# Patient Record
Sex: FEMALE | Race: OTHER | Employment: FULL TIME | URBAN - NONMETROPOLITAN AREA
[De-identification: names, ages, dates, MRNs, and addresses within clinical notes are randomized per-mention and may not be internally consistent; named-entity substitution may affect disease eponyms.]

---

## 2020-10-07 ENCOUNTER — OFFICE VISIT (OUTPATIENT)
Dept: OBGYN CLINIC | Age: 20
End: 2020-10-07
Payer: COMMERCIAL

## 2020-10-07 ENCOUNTER — HOSPITAL ENCOUNTER (OUTPATIENT)
Age: 20
Setting detail: SPECIMEN
Discharge: HOME | End: 2020-10-07
Payer: COMMERCIAL

## 2020-10-07 VITALS
DIASTOLIC BLOOD PRESSURE: 54 MMHG | WEIGHT: 145 LBS | BODY MASS INDEX: 23.3 KG/M2 | SYSTOLIC BLOOD PRESSURE: 88 MMHG | HEIGHT: 66 IN

## 2020-10-07 PROCEDURE — 99385 PREV VISIT NEW AGE 18-39: CPT | Performed by: ADVANCED PRACTICE MIDWIFE

## 2020-10-07 ASSESSMENT — ENCOUNTER SYMPTOMS
RESPIRATORY NEGATIVE: 1
EYES NEGATIVE: 1
GASTROINTESTINAL NEGATIVE: 1

## 2020-10-07 NOTE — PROGRESS NOTES
Grandmother     No Known Problems Maternal Grandfather     No Known Problems Father     No Known Problems Mother     No Known Problems Brother     No Known Problems Sister     No Known Problems Other        Chief Complaint   Patient presents with    Annual Exam     Annual, doing well. First Pap. No concerns at this time. Labs:    No results found for this visit on 10/07/20. HPI: Denies breast/pelvic concerns. Menses regular. First pap smear.  currently in [facility]. Review of Systems   Constitutional: Negative. HENT: Negative. Eyes: Negative. Respiratory: Negative. Cardiovascular: Negative. Gastrointestinal: Negative. Genitourinary: Negative. Musculoskeletal: Negative. Neurological: Negative. Psychiatric/Behavioral: Negative. Objective  Blood pressure (!) 88/54, height 5' 6\" (1.676 m), weight 145 lb (65.8 kg), last menstrual period 10/02/2020. Physical Exam  Vitals signs and nursing note reviewed. Constitutional:       Appearance: Normal appearance. HENT:      Head: Normocephalic. Eyes:      Pupils: Pupils are equal, round, and reactive to light. Neck:      Musculoskeletal: Normal range of motion. No muscular tenderness. Cardiovascular:      Rate and Rhythm: Normal rate and regular rhythm. Pulses: Normal pulses. Heart sounds: Normal heart sounds. No murmur. Pulmonary:      Effort: Pulmonary effort is normal.      Breath sounds: Normal breath sounds. No wheezing. Abdominal:      General: Abdomen is flat. Palpations: Abdomen is soft. Tenderness: There is no abdominal tenderness. Musculoskeletal: Normal range of motion. Skin:     General: Skin is warm and dry. Neurological:      Mental Status: She is alert and oriented to person, place, and time.    Psychiatric:         Mood and Affect: Mood normal.         Behavior: Behavior normal.                                  Assessment and Plan          Diagnosis Orders   1. Smear, vaginal, as part of routine gynecological examination  PAP SMEAR       Repeat Annual every 1 year  Cervical Cytology Evaluation begins at 24years old. If Negative Cytology, Follow-up screening per current guidelines. Mammograms every 1year. If 37 yo and last mammogram was negative. Calcium and Vitamin D dosing reviewed. Colonoscopy screening reviewed as well as onset for bone density testing. Birth control and barrier recommendationsdiscussed. STD counseling and prevention reviewed. Gardisil counseling completed for all patients. .  Routine healthmaintenance per patients PCP. Bairon Phelps does not currently have medications on file. Return in about 1 year (around 10/7/2021) for Yearly. She was also counseled on her preventative health maintenance recommendations and follow-up. There are no Patient Instructions on file for this visit.     ASHA DE LA O,66/0/1857 9:40 AM

## 2020-10-20 LAB — CYTOLOGY REPORT: NORMAL

## 2021-01-14 ENCOUNTER — OFFICE VISIT (OUTPATIENT)
Dept: OBGYN CLINIC | Age: 21
End: 2021-01-14
Payer: COMMERCIAL

## 2021-01-14 ENCOUNTER — TELEPHONE (OUTPATIENT)
Dept: OBGYN CLINIC | Age: 21
End: 2021-01-14

## 2021-01-14 VITALS — DIASTOLIC BLOOD PRESSURE: 72 MMHG | BODY MASS INDEX: 23.76 KG/M2 | SYSTOLIC BLOOD PRESSURE: 121 MMHG | WEIGHT: 147.2 LBS

## 2021-01-14 DIAGNOSIS — N94.6 DYSMENORRHEA: Primary | ICD-10-CM

## 2021-01-14 DIAGNOSIS — N92.0 MENORRHAGIA WITH REGULAR CYCLE: ICD-10-CM

## 2021-01-14 PROCEDURE — 99213 OFFICE O/P EST LOW 20 MIN: CPT | Performed by: NURSE PRACTITIONER

## 2021-01-14 PROCEDURE — 1036F TOBACCO NON-USER: CPT | Performed by: NURSE PRACTITIONER

## 2021-01-14 PROCEDURE — G8420 CALC BMI NORM PARAMETERS: HCPCS | Performed by: NURSE PRACTITIONER

## 2021-01-14 PROCEDURE — G8484 FLU IMMUNIZE NO ADMIN: HCPCS | Performed by: NURSE PRACTITIONER

## 2021-01-14 PROCEDURE — G8427 DOCREV CUR MEDS BY ELIG CLIN: HCPCS | Performed by: NURSE PRACTITIONER

## 2021-01-14 ASSESSMENT — ENCOUNTER SYMPTOMS
RESPIRATORY NEGATIVE: 1
GASTROINTESTINAL NEGATIVE: 1

## 2021-01-14 NOTE — PROGRESS NOTES
PROBLEM VISIT     Date of service: 2021    Aleena Tesfaye  Is a 23 y.o. female    PT's PCP is: Dixie Parikh MD     : 2000                                             Subjective:       Patient's last menstrual period was 2020. OB History    Para Term  AB Living   0 0 0 0 0 0   SAB TAB Ectopic Molar Multiple Live Births   0 0 0 0 0 0        Social History     Tobacco Use   Smoking Status Never Smoker   Smokeless Tobacco Never Used        Social History     Substance and Sexual Activity   Alcohol Use Yes    Comment: Occasionally       Allergies: Patient has no known allergies. No current outpatient medications on file. Social History     Substance and Sexual Activity   Sexual Activity Yes    Partners: Male       Chief Complaint   Patient presents with    Menstrual Problem     C/O heavy menses. Would like to discuss IUD for tx. PE:  Vital Signs  Blood pressure 121/72, weight 147 lb 3.2 oz (66.8 kg), last menstrual period 2020. HPI: Patient here to discuss hormonal control of menses. Reports monthly periods but they are heavy/painful, desires treatment. PT denies fever, chills, nausea and vomiting       Review of Systems   Constitutional: Negative. Respiratory: Negative. Cardiovascular: Negative. Gastrointestinal: Negative. Genitourinary: Positive for menstrual problem (heavy/cramping). Negative for dysuria, frequency, pelvic pain and vaginal discharge. Neurological: Negative. Physical Exam  Constitutional:       Appearance: Normal appearance. HENT:      Head: Normocephalic. Pulmonary:      Effort: Pulmonary effort is normal.   Musculoskeletal: Normal range of motion. Neurological:      General: No focal deficit present. Mental Status: She is alert. Psychiatric:         Mood and Affect: Mood normal.         Behavior: Behavior normal.         Assessment and Plan          Diagnosis Orders   1.  Dysmenorrhea 2. Menorrhagia with regular cycle         Patient desires IUD for cycle control. She has previously used OCP's but was not consistent with taking them. Discussed MOA, procedure for insertion, risks/benefits, common bleeding patterns. Nico Corcoran does not currently have medications on file. Return for will coordinate appt for placement of IUD when arrived. There are no Patient Instructions on file for this visit.     Linda Patient Sean,1/14/2021 9:01 PM

## 2021-01-27 NOTE — TELEPHONE ENCOUNTER
Received benefit coverage summary. Spoke with pt and told her that summary said that device and procedure is covered at 100%. I did let pt know that pharmacy may contact her if any money is due or if they have any questions. Pt states understanding and ok to go ahead and order device. Prescription and enrollment form faxed to [facility] with confirmation of fax received. Pt aware I will call her when device arrives to discuss scheduling insertion.

## 2021-02-03 NOTE — TELEPHONE ENCOUNTER
Received prescription request form from [address] [place] [address] Filled out and faxed back to [facility] with confirmation of fax received.

## 2021-02-15 DIAGNOSIS — N92.6 IRREGULAR PERIODS: Primary | ICD-10-CM

## 2021-02-15 NOTE — TELEPHONE ENCOUNTER
Spoke with  at [facility] [employer] to find out status of pts Omar device. She said pt needs to call and give her ok to schedule delivery to our office. Pt to call number [phone]. I spoke with pt and she is aware and will call them. She will call office with any issues.

## 2021-02-15 NOTE — TELEPHONE ENCOUNTER
Pt spoke with [facility] and they told her the device would ship out this week and arrive in the office by Tues Feb 23. I explained to pt to call on cycle day 1 to schedule insertion cycle day 5-9. She is also aware of the need for SPT the day prior to insertion. Order is in [address] [place] [address] Rd.

## 2021-03-03 ENCOUNTER — HOSPITAL ENCOUNTER (OUTPATIENT)
Age: 21
Setting detail: SPECIMEN
Discharge: HOME | End: 2021-03-03
Payer: COMMERCIAL

## 2021-03-03 DIAGNOSIS — N92.6 IRREGULAR PERIODS: ICD-10-CM

## 2021-03-03 LAB — HCG QUANTITATIVE: <1 IU/L

## 2021-03-04 ENCOUNTER — PROCEDURE VISIT (OUTPATIENT)
Dept: OBGYN CLINIC | Age: 21
End: 2021-03-04
Payer: COMMERCIAL

## 2021-03-04 VITALS — SYSTOLIC BLOOD PRESSURE: 120 MMHG | BODY MASS INDEX: 24.21 KG/M2 | DIASTOLIC BLOOD PRESSURE: 61 MMHG | WEIGHT: 150 LBS

## 2021-03-04 DIAGNOSIS — Z30.430 ENCOUNTER FOR INSERTION OF INTRAUTERINE CONTRACEPTIVE DEVICE (IUD): ICD-10-CM

## 2021-03-04 DIAGNOSIS — N94.6 DYSMENORRHEA: Primary | ICD-10-CM

## 2021-03-04 PROCEDURE — 58300 INSERT INTRAUTERINE DEVICE: CPT | Performed by: NURSE PRACTITIONER

## 2021-03-04 NOTE — PROGRESS NOTES
The patient is a 23 y.o. female that presents for IUD insertion for dysmenorrhea    OB History        0    Para   0    Term   0       0    AB   0    Living   0       SAB   0    TAB   0    Ectopic   0    Molar   0    Multiple   0    Live Births   0                Allergies: Patient has no known allergies. Vitals: Blood pressure 120/61, weight 150 lb (68 kg), last menstrual period 2021. Premedicated with Motrin 800 mg: No    Cytotec 200mcg:No    HCG: negative     Consent signed:  Yes    PROCEDURE:    Karen Dyer Lot # [id number], Expiration date 2024      Bimanual exam: anteverted    Cervix cleansed with: Hibiclens-Alcoholx3    Tenaculum applied: Yes     Uterus sounded: 8cm    Lot # [id number], Expiration date 2024 inserted without difficulty. IUD strings trimmed to 3 cm. Assessment:   Diagnosis Orders   1. Dysmenorrhea     2. Encounter for insertion of intrauterine contraceptive device (IUD)           Plan:  Education on IUD  Abstain from intercourse for 72 hours  Motrin 800 mg Q 8 hours PRN  RTO one month for ultrasound for sting check. See [facility] for lot # and NDC #    Return in about 1 month (around 2021) for string check.     Isaak Estrada,3/4/2021 3:02 PM

## 2021-04-07 ENCOUNTER — OFFICE VISIT (OUTPATIENT)
Dept: OBGYN CLINIC | Age: 21
End: 2021-04-07
Payer: COMMERCIAL

## 2021-04-07 VITALS — BODY MASS INDEX: 24.63 KG/M2 | WEIGHT: 152.6 LBS | SYSTOLIC BLOOD PRESSURE: 123 MMHG | DIASTOLIC BLOOD PRESSURE: 72 MMHG

## 2021-04-07 DIAGNOSIS — R10.2 PELVIC PAIN: ICD-10-CM

## 2021-04-07 DIAGNOSIS — Z30.431 ENCOUNTER FOR ROUTINE CHECKING OF INTRAUTERINE CONTRACEPTIVE DEVICE (IUD): Primary | ICD-10-CM

## 2021-04-07 PROCEDURE — 99213 OFFICE O/P EST LOW 20 MIN: CPT | Performed by: NURSE PRACTITIONER

## 2021-04-07 PROCEDURE — 1036F TOBACCO NON-USER: CPT | Performed by: NURSE PRACTITIONER

## 2021-04-07 PROCEDURE — G8420 CALC BMI NORM PARAMETERS: HCPCS | Performed by: NURSE PRACTITIONER

## 2021-04-07 PROCEDURE — G8427 DOCREV CUR MEDS BY ELIG CLIN: HCPCS | Performed by: NURSE PRACTITIONER

## 2021-04-07 ASSESSMENT — ENCOUNTER SYMPTOMS: GASTROINTESTINAL NEGATIVE: 1

## 2021-04-07 NOTE — PROGRESS NOTES
PROBLEM VISIT     Date of service: 2021    Chris Barton  Is a 23 y.o. female    PT's PCP is: Eunice Black MD     : 2000                                             Subjective:       No LMP recorded. OB History    Para Term  AB Living   0 0 0 0 0 0   SAB TAB Ectopic Molar Multiple Live Births   0 0 0 0 0 0        Social History     Tobacco Use   Smoking Status Never Smoker   Smokeless Tobacco Never Used        Social History     Substance and Sexual Activity   Alcohol Use Yes    Comment: Occasionally       Allergies: Patient has no known allergies. Current Outpatient Medications:     Levonorgestrel (LILETTA, 52 MG,) IUD 52 mg, 1 each by Intrauterine route once, Disp: , Rfl:     Social History     Substance and Sexual Activity   Sexual Activity Yes    Partners: Male    Birth control/protection: I.U.D. Comment: Joseetta       Chief Complaint   Patient presents with    Follow-up     Liletta string check. Still having cramping post insertion. PE:  Vital Signs  Blood pressure 123/72, weight 152 lb 9.6 oz (69.2 kg). HPI: Patient presents today for string check. Reports cramping x2 weeks post Liletta insertion, bleeding has stopped. PT denies fever, chills, nausea and vomiting       Review of Systems   Constitutional: Negative. Cardiovascular: Negative. Gastrointestinal: Negative. Genitourinary: Positive for pelvic pain (cramping s/p insertion). Negative for dysuria, flank pain, vaginal bleeding and vaginal discharge. Physical Exam  Constitutional:       Appearance: Normal appearance. HENT:      Head: Normocephalic. Genitourinary:     General: Normal vulva. Vagina: No vaginal discharge. Cervix: Normal.      Uterus: Not tender. Adnexa:         Right: No tenderness. Left: No tenderness. Comments: IUD string in cervical os   Musculoskeletal: Normal range of motion.    Neurological:      Mental Status: She is alert. chaperone: not present     Assessment and Plan          Diagnosis Orders   1. Encounter for routine checking of intrauterine contraceptive device (IUD)     2. Pelvic pain         IUD strings seen in cervical os- will verify placement with usn due to reports of cramping x2 weeks following insertion. Patient is agreeable. I am having Victorialetty Lima maintain her Liletta (52 MG). Return for usn check IUD position . There are no Patient Instructions on file for this visit.     Darcy Estrada,4/7/2021 9:44 AM

## 2021-04-22 ENCOUNTER — OFFICE VISIT (OUTPATIENT)
Dept: OBGYN CLINIC | Age: 21
End: 2021-04-22
Payer: COMMERCIAL

## 2021-04-22 VITALS — BODY MASS INDEX: 24.82 KG/M2 | SYSTOLIC BLOOD PRESSURE: 121 MMHG | DIASTOLIC BLOOD PRESSURE: 74 MMHG | WEIGHT: 153.8 LBS

## 2021-04-22 DIAGNOSIS — Z30.431 ENCOUNTER FOR ROUTINE CHECKING OF INTRAUTERINE CONTRACEPTIVE DEVICE (IUD): Primary | ICD-10-CM

## 2021-04-22 PROCEDURE — G8427 DOCREV CUR MEDS BY ELIG CLIN: HCPCS | Performed by: NURSE PRACTITIONER

## 2021-04-22 PROCEDURE — 1036F TOBACCO NON-USER: CPT | Performed by: NURSE PRACTITIONER

## 2021-04-22 PROCEDURE — G8420 CALC BMI NORM PARAMETERS: HCPCS | Performed by: NURSE PRACTITIONER

## 2021-04-22 PROCEDURE — 99213 OFFICE O/P EST LOW 20 MIN: CPT | Performed by: NURSE PRACTITIONER

## 2021-04-22 NOTE — PROGRESS NOTES
PROBLEM VISIT     Date of service: 2021    Bonilla Veliz  Is a 23 y.o. female    PT's PCP is: Belkys Chaidez MD     : 2000                                             Subjective:       Patient's last menstrual period was 2021. OB History    Para Term  AB Living   0 0 0 0 0 0   SAB TAB Ectopic Molar Multiple Live Births   0 0 0 0 0 0        Social History     Tobacco Use   Smoking Status Never Smoker   Smokeless Tobacco Never Used        Social History     Substance and Sexual Activity   Alcohol Use Yes    Comment: Occasionally       Allergies: Patient has no known allergies. Current Outpatient Medications:     Levonorgestrel (LILETTA, 52 MG,) IUD 52 mg, 1 each by Intrauterine route once, Disp: , Rfl:     Social History     Substance and Sexual Activity   Sexual Activity Yes    Partners: Male    Birth control/protection: I.U.D. Comment: Omar       Chief Complaint   Patient presents with    Follow-up     Follow up usn to check IUD placement. PE:  Vital Signs  Blood pressure 121/74, weight 153 lb 12.8 oz (69.8 kg), last menstrual period 2021. HPI: Patient presents following usn to check IUD placement. Patient also reports all cramping as since resolved and had menses 21. Denies any concerns. PT denies fever, chills, nausea and vomiting       Review of Systems   Constitutional: Negative. Genitourinary: Negative. Physical Exam  Constitutional:       Appearance: Normal appearance. HENT:      Head: Normocephalic. Pulmonary:      Effort: Pulmonary effort is normal.   Musculoskeletal: Normal range of motion. Neurological:      General: No focal deficit present. Mental Status: She is alert. Psychiatric:         Mood and Affect: Mood normal.         Behavior: Behavior normal.         Assessment and Plan          Diagnosis Orders   1.  Encounter for routine checking of intrauterine contraceptive device (IUD) usn- adenomyotic appearing uterus, endometrium measures 3.6mm, IUD is in correct position with both arms deployed, dominant follicle on right ovary 2.6 x 1.7 x 2.3cm      I am having [address] maintain her Liletta (52 MG). Return if symptoms worsen or fail to improve. There are no Patient Instructions on file for this visit.     Liset Estrada,4/22/2021 10:05 AM